# Patient Record
Sex: MALE | Race: WHITE
[De-identification: names, ages, dates, MRNs, and addresses within clinical notes are randomized per-mention and may not be internally consistent; named-entity substitution may affect disease eponyms.]

---

## 2017-03-31 ENCOUNTER — HOSPITAL ENCOUNTER (EMERGENCY)
Dept: HOSPITAL 25 - ED | Age: 57
Discharge: HOME | End: 2017-03-31
Payer: COMMERCIAL

## 2017-03-31 ENCOUNTER — HOSPITAL ENCOUNTER (EMERGENCY)
Dept: HOSPITAL 25 - UCEAST | Age: 57
Discharge: LEFT BEFORE DISCHARGE | End: 2017-03-31
Payer: COMMERCIAL

## 2017-03-31 VITALS — DIASTOLIC BLOOD PRESSURE: 93 MMHG | SYSTOLIC BLOOD PRESSURE: 164 MMHG

## 2017-03-31 VITALS — SYSTOLIC BLOOD PRESSURE: 148 MMHG | DIASTOLIC BLOOD PRESSURE: 85 MMHG
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
## 2017-03-31 DIAGNOSIS — S86.912A: Primary | ICD-10-CM

## 2017-03-31 DIAGNOSIS — M79.89: Primary | ICD-10-CM
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
## 2017-03-31 DIAGNOSIS — M79.605: ICD-10-CM

## 2017-03-31 PROCEDURE — 99281 EMR DPT VST MAYX REQ PHY/QHP: CPT

## 2017-03-31 PROCEDURE — 99212 OFFICE O/P EST SF 10 MIN: CPT

## 2017-03-31 PROCEDURE — G0463 HOSPITAL OUTPT CLINIC VISIT: HCPCS

## 2017-03-31 NOTE — RAD
HISTORY: Left lower leg trauma, calf swelling



COMPARISONS: None



VIEWS: 8, Frontal and lateral views of the left foreleg, frontal, lateral, and oblique

views of the left foot, frontal, lateral, and oblique views of the left ankle



FINDINGS:



BONE DENSITY: Normal.

BONES: There is no displaced fracture. Incidentally noted is an exostosis of the distal

fifth metatarsal

JOINTS: There is no arthropathy.    

ALIGNMENT: There is no dislocation. 

SOFT TISSUES: Unremarkable.



OTHER FINDINGS: None.



IMPRESSION: 

NO ACUTE OSSEOUS INJURY TO THE LEFT FORELEG, ANKLE, OR FOOT. IF SYMPTOMS PERSIST,

RECOMMEND REPEAT IMAGING.

## 2017-03-31 NOTE — RAD
HISTORY: Left leg pain



COMPARISONS: None relevant



TECHNIQUE: Multiple transverse and longitudinal ultrasound images were obtained of the

left lower extremity from the level of the common femoral vein inferiorly through to the

infrapopliteal veins  using grayscale, color Doppler, and spectral Doppler imaging with

and without compression and with augmentation. Comparison images were obtained of the

contralateral common femoral vein.



FINDINGS:

VEINS: The venous system of the left lower extremity is compressible throughout its

course, with normal flow on color Doppler imaging and normal response to augmentation on

spectral Doppler imaging.



SOFT TISSUES: There is a small fluid collection within the left calf measuring

approximately 4.6 x 0.7 x 11.2 cm in size.



OTHER FINDINGS: None.



IMPRESSION: 

1.  NO LEFT LOWER EXTREMITY DEEP VEIN THROMBOSIS

2.  SMALL FLUID COLLECTION WITHIN THE LEFT CALF MAY REFLECT SEROMA/EVOLVING HEMATOMA GIVEN

THE HISTORY OF TRAUMA

## 2017-04-01 NOTE — ED
Lower Extremity





- HPI Summary


HPI Summary: 





Patient presents to ED with CC left lower extremity swelling after sustaining 

an injury while skiing.  He states he fell and came out of his boot.  Since then

, he has had ecchymosis around the posterior portion of the left foot, +3 

swelling and painful to walk.  He was ambulatory on arrival and states he was 

able to bike to work this am.  His pain is located in the posterior calf.  He 

denies recent travel, smoking, malignancy or recent bedrest.  He has never had 

anything like this before.  Denies PMHx and denies medications. 





- History of Current Complaint


Chief Complaint: EDExtremityLower


Stated Complaint: LT LEG SWOLLEN


Time Seen by Provider: 03/31/17 22:00


Hx Obtained From: Patient


Mechanism Of Injury: Fall From A Standing Position, Twisted


Onset of Pain: Immediate


Onset/Duration: Hours


Severity Initially: Moderate


Severity Currently: Moderate


Pain Intensity: 0


Pain Scale Used: 0-10 Numeric


Timing: Constant


Location: Is Discrete @ - left posterior calf


Character Of Pain: Aching, Throbbing


Associated Signs And Symptoms: Positive: Swelling, Bruising


Aggravating Factor(s): Standing, Ambulation


Alleviating Factor(s): Rest, Elevation


Able to Bear Weight: Yes





- Risk Factors


Gout Risk Factors: Age Over 40, Male


DVT Risk Factors: Negative


Septic Arthritis Risk Factor: Negative





- Allergies/Home Medications


Allergies/Adverse Reactions: 


 Allergies











Allergy/AdvReac Type Severity Reaction Status Date / Time


 


No Known Allergies Allergy   Verified 03/31/17 21:34














PMH/Surg Hx/FS Hx/Imm Hx


Previously Healthy: Yes


Endocrine/Hematology History: 


   Denies: Hx Diabetes, Hx Thyroid Disease


Cardiovascular History: 


   Denies: Hx Hypertension


Respiratory History: 


   Denies: Hx Asthma, Hx Chronic Obstructive Pulmonary Disease (COPD)


GI History: 


   Denies: Hx Ulcer


Infectious Disease History: No


Infectious Disease History: 


   Denies: Hx Hepatitis, Hx Human Immunodeficiency Virus (HIV), Traveled 

Outside the [place] in Last 30 Days





- Social History


Occupation: Employed Full-time


Lives: With Family


Alcohol Use: None


Hx Substance Use: No


Substance Use Type: Reports: None


Hx Tobacco Use: No


Smoking Status (MU): Never Smoked Tobacco


Do You Chew or Dip Tobacco: No





Review of Systems


Constitutional: Negative


Eyes: Negative


Cardiovascular: Negative


Respiratory: Negative


Genitourinary: Negative


Positive: no symptoms reported, see HPI


Positive: Myalgia


Positive: Bruising


Neurological: Negative


Psychological: Normal


All Other Systems Reviewed And Are Negative: Yes





Physical Exam


Triage Information Reviewed: Yes


Vital Signs On Initial Exam: 


 Initial Vitals











Temp Pulse Resp BP Pulse Ox


 


 97.9 F   60   16   148/85   100 


 


 03/31/17 21:30  03/31/17 21:30  03/31/17 21:30  03/31/17 21:30  03/31/17 21:30











Vital Signs Reviewed: Yes


Appearance: Positive: Well-Appearing, Well-Nourished


Skin: Positive: Warm, Skin Color Reflects Adequate Perfusion


Eyes: Positive: Normal, ROMY


ENT: Positive: Normal ENT inspection


Neck: Positive: Supple, No Lymphadenopathy


Respiratory/Lung Sounds: Positive: Clear to Auscultation, Breath Sounds Present


Cardiovascular: Positive: Normal, RRR


Bowel Sounds: Positive: Present


Musculoskeletal: Positive: Pain @ - posterior calf


Neurological: Positive: Normal, Sensory/Motor Intact, Alert, Oriented to Person 

Place, Time


Psychiatric: Positive: Normal


AVPU Assessment: Alert





Diagnostics





- Vital Signs


 Vital Signs











  Temp Pulse Resp BP Pulse Ox


 


 03/31/17 21:30  97.9 F  60  16  148/85  100














- Laboratory


Lab Statement: Any lab studies that have been ordered have been reviewed, and 

results considered in the medical decision making process.





Lower Extremity Course/Dx





- Course


Course Of Treatment: Patient sent for US.  Xrays at  of lower leg and ankle 

revealed no fracture.  Patient encouraged to elevate and ice and use 

compression hose and ibuprofen for the time until healing.  He agrees and OK 

with discharge and follow up.





- Diagnoses


Differential Diagnosis/HQI/PQRI: Positive: Contusion, DVT, Sprain, Strain


Provider Diagnoses: 


 Muscle strain








Discharge





- Discharge Plan


Condition: Stable


Disposition: HOME


Patient Education Materials:  Leg Edema (ED)


Referrals: 


Afua Balbuena MD [Primary Care Provider] - 


Additional Instructions: 


Ibuprofen 600mg three times daily with meals for pain.  


Follow up with orthopedic physician in 5-7 days if symptoms persist. 


If numbness, tingling, decreased sensation, increased pain, temperature changes 

or pallor noted in toes, come back to ER immediately. 


Protect the area.   For your comfort level, do not bear weight, pull or push 

until you are without pain.


Rest the involved area, but not too long.  You may need to be off your injury 

for some time to allow for healing, however excessive immobilization of joints 

can lead to stiffness and delay healing time. Early mobilization is encouraged 

if it is pain-free.


Ice.  Not directly on the skin. Cover with a towel. Apply ice no more than 30 

minutes at a time 


Compression:  You may use and keep an ace wrap bandage over the injury to 

decrease swelling. Again, this should be limited and be taken off periodically 

to encourage early range of motion and mobilization.


Elevate: Try to elevate the injured area above the heart whenever possible.

## 2017-04-02 NOTE — UC
Clare LEONARDO Michael, scribed for Fifi Francisco MD on 03/31/17 at 1944 .





Lower Extremity/Ankle HPI





- HPI Summary


HPI Summary: 





55 y/o male comes to [facility] after a skiing accident 6 days ago. The pt 

c/o LLE swelling and stiffness. He rates the LLE pain as a 1 out of 10 on a 

pain assessment scale and the stiffness is aggravated with ambulation. The pt 

denies knee pain, CP, palpitations, and SOB.  





- History of Current Complaint


Chief Complaint: UCLowerExtremity


Stated Complaint: LT LEG INJURY


Time Seen by Provider: 03/31/17 19:32


Hx Obtained From: Patient, Medical Records


Onset/Duration: Gradual Onset, Lasting Days, Still Present


Severity Initially: Mild


Severity Currently: Mild


Pain Intensity: 1


Pain Scale Used: 0-10 Numeric


Aggravating Factor(s): Ambulation


Alleviating Factor(s): Nothing


Related History: Other - skiiing





- Allergies/Home Medications


Allergies/Adverse Reactions: 


 Allergies











Allergy/AdvReac Type Severity Reaction Status Date / Time


 


No Known Allergies Allergy   Verified 03/31/17 21:34














PMH/Surg Hx/FS Hx/Imm Hx


Endocrine History Of: 


   Denies: Diabetes, Thyroid Disease


Cardiovascular History Of: 


   Denies: Cardiac Disorders, Hypertension


Respiratory History Of: 


   Denies: COPD, Asthma


GI/ History Of: 


   Denies: Ulcer





- Surgical History


Surgical History: None





- Family History


Known Family History: 


   Negative: Blood Disorder





- Social History


Occupation: Employed Full-time


Lives: With Family


Alcohol Use: None


Substance Use Type: None


Smoking Status (MU): Never Smoked Tobacco





Review of Systems


Respiratory: Negative - SOB


Cardiovascular: Negative - CP, palpitaions


Musculoskeletal: Other: - LLE swelling and stiffness


All Other Systems Reviewed And Are Negative: Yes





Physical Exam


Triage Information Reviewed: Yes


Appearance: Well-Nourished


Vital Signs: 


 Initial Vital Signs











Temp  98.8 F   03/31/17 18:47


 


Pulse  65   03/31/17 18:47


 


Resp  16   03/31/17 18:47


 


BP  164/93   03/31/17 18:47


 


Pulse Ox  97   03/31/17 18:47











Vital Signs Reviewed: Yes


Eye Exam: Normal


ENT Exam: Normal


Neck exam: Normal - no adenopathy


Respiratory Exam: Normal


Respiratory: Positive: Chest non-tender, Lungs clear, Normal breath sounds, No 

respiratory distress, Other: - no dyspnea, no tachypnea. nml respiratory rate


Cardiovascular: Positive: RRR, No Murmur, Pulses Normal, Brisk Capillary Refill


Abdominal Exam: Normal


Abdomen Description: Positive: Nontender, No Organomegaly, Soft


Bowel Sounds: Positive: Present


Musculoskeletal: Positive: Other: - lower distal tibia area tenderness. 

bruising of the left heel.


Neurological Exam: Normal - nonfocal. grossly intact.


Psychological Exam: Normal - conserving easily


Psychological: Positive: Age Appropriate Behavior


Skin Exam: Normal - no rashes visible





Diagnostics





- Radiology


  ** Ankle XR


Xray Interpretation: No Acute Changes - NO ACUTE OSSEOUS INJURY TO THE LEFT 

FORELEG, ANKLE, OR FOOT. IF SYMPTOMS PERSIST, RECOMMEND REPEAT IMAGING.


Radiology Interpretation Completed By: Radiologist





  ** Foot XR


Xray Interpretation: No Acute Changes - NO ACUTE OSSEOUS INJURY TO THE LEFT 

FORELEG, ANKLE, OR FOOT. IF SYMPTOMS PERSIST, RECOMMEND REPEAT IMAGING.


Radiology Interpretation Completed By: Radiologist





  ** Leg XR


Xray Interpretation: No Acute Changes - NO ACUTE OSSEOUS INJURY TO THE LEFT 

FORELEG, ANKLE, OR FOOT. IF SYMPTOMS PERSIST, RECOMMEND REPEAT IMAGING.


Radiology Interpretation Completed By: Radiologist





Lower Extremity Course/Dx





- Course


Course Of Treatment: Patient offered and encouraged EMS. Patient declines. 

[place] [facility] [place]  Bret Cotton.





- Differential Dx/Diagnosis


Provider Diagnoses: Left lower extremity swelling and pain





Discharge





- Discharge Plan


Condition: Stable


Disposition: AGAINST MEDICAL ADVICE


Referrals: 


Afua Balbuena MD [Primary Care Provider] - 





The documentation as recorded by the Clare warren Michael accurately 

reflects the service I personally performed and the decisions made by me, Fifi Francisco MD.

## 2019-07-27 ENCOUNTER — HOSPITAL ENCOUNTER (EMERGENCY)
Dept: HOSPITAL 25 - UCEAST | Age: 59
Discharge: HOME | End: 2019-07-27
Payer: COMMERCIAL

## 2019-07-27 VITALS — DIASTOLIC BLOOD PRESSURE: 98 MMHG | SYSTOLIC BLOOD PRESSURE: 154 MMHG

## 2019-07-27 DIAGNOSIS — L25.9: Primary | ICD-10-CM

## 2019-07-27 PROCEDURE — 85060 BLOOD SMEAR INTERPRETATION: CPT

## 2019-07-27 PROCEDURE — 80053 COMPREHEN METABOLIC PANEL: CPT

## 2019-07-27 PROCEDURE — 99211 OFF/OP EST MAY X REQ PHY/QHP: CPT

## 2019-07-27 PROCEDURE — 84443 ASSAY THYROID STIM HORMONE: CPT

## 2019-07-27 PROCEDURE — 85025 COMPLETE CBC W/AUTO DIFF WBC: CPT

## 2019-07-27 PROCEDURE — 82607 VITAMIN B-12: CPT

## 2019-07-27 PROCEDURE — 82728 ASSAY OF FERRITIN: CPT

## 2019-07-27 PROCEDURE — 36415 COLL VENOUS BLD VENIPUNCTURE: CPT

## 2019-07-27 PROCEDURE — G0463 HOSPITAL OUTPT CLINIC VISIT: HCPCS

## 2019-07-27 PROCEDURE — 83735 ASSAY OF MAGNESIUM: CPT

## 2019-07-27 NOTE — UC
Skin Complaint HPI





- HPI Summary


HPI Summary: 





59 year old male with no pmH presents with burning, itchy sensation, worse at 

night . ~ 8 days.  started in groin, spread to chest, upper arms, b/l thighs.  

no rashes seen.  Patient lives in tent under lean-to outdoors with a bird 

living in lean-to next to tent.   recent travel to [facility] with exposure 

to sand.   STayed at 5 star hotel, did to laundry there.    





NO new food, no eating mushrooms, no ETOH/ drug use.  





Denies fever, chills, nipples pain, rashes, joint ahches, headaches, muscle 

pains, involvement of hair.    Otherwise feeling well. 


 





- History of Current Complaint


Chief Complaint: UCSkin


Time Seen by Provider: 07/27/19 13:12


Stated Complaint: RASH


Hx Obtained From: Patient


Onset/Duration: Sudden Onset, Lasting Days, Still Present


Timing: Constant


Current Severity: None


Pain Intensity: 0


Pain Scale Used: 0-10 Numeric


Location: Diffuse


Character: Exposure to Heat Intermittent


Aggravating Factor(s): Nothing


Alleviating Factor(s): Nothing


Associated Signs & Symptoms: Negative: Numbness, Weakness, Pallor, Difficulty 

Breathing, Fever, Chills, Chest Pain, Throat Tightening, Rash, Abdominal Pain, 

Lightheadedness, Syncope, Bruising, Tenderness, Red Streaks, Joint Swelling





- Allergy/Home Medications


Allergies/Adverse Reactions: 


 Allergies











Allergy/AdvReac Type Severity Reaction Status Date / Time


 


oxacillin Allergy  Hives Verified 07/27/19 13:13














PMH/Surg Hx/FS Hx/Imm Hx


Previously Healthy: Yes





- Surgical History


Surgical History: Yes


Surgery Procedure, Year, and Place: Left knee.  TONSILS





- Family History


Known Family History: Positive: None, Non-Contributory





- Social History


Alcohol Use: None


Substance Use Type: None


Smoking Status (MU): Never Smoked Tobacco





Review of Systems


All Other Systems Reviewed And Are Negative: Yes


Constitutional: Positive: Negative


Skin: Positive: Other - itching, burning sensation on skin


Motor: Negative: Decreased ROM, Weakness


Musculoskeletal: Negative: Arthralgia, Calf Tenderness, Decreased ROM, Edema, 

Myalgia


Neurological: Negative: Weakness, Paresthesia, Numbness


Is Patient Immunocompromised?: No





Physical Exam


Triage Information Reviewed: Yes


Appearance: Well-Appearing, No Pain Distress, Well-Nourished


Vital Signs: 


 Initial Vital Signs











Temp  99.7 F   07/27/19 13:10


 


Pulse  69   07/27/19 13:10


 


Resp  17   07/27/19 13:10


 


BP  154/98   07/27/19 13:10


 


Pulse Ox  98   07/27/19 13:10











Vital Signs Reviewed: Yes


Eyes: Positive: Conjunctiva Clear


Neck: Positive: Supple, Nontender, No Lymphadenopathy.  Negative: Nuchal 

Rigidity


Respiratory: Positive: No respiratory distress, No accessory muscle use


Abdomen Description: Positive: Soft.  Negative: Distended, Guarding


Male Genital Exam: Positive: Normal Genitalia.  Negative: Epididymal Tenderness

, Erythema, Hernia Mass, Inguinal Tenderness, Lesions


Musculoskeletal: Positive: Strength Intact, ROM Intact.  Negative: No Edema


Neurological: Positive: Alert, Muscle Tone Normal


Psychological Exam: Normal


Skin: Positive: Other - no rashes, lesions, excoriations, vesicles, erytthema 

seen throughout body, including skin folds, between finger/ toes, nippes, chest

, groin b/l, UE, LEs  no TTP over joints, no effusions noted..  Negative: Rashes

, Breakdown





Course/Dx





- Course


Course Of Treatment: 





Discussed case with Dr. Doan and Dr. Ulloa, patients PCP.  lab work drawn 

to eval for interal cause such as kidney, liver damage.   Treatment for mites 

including washing all bedding/ home and showering.    ANtihistamines to help 

with itch at night. 


 





- Differential Diagnoses - Skin Complaint


Differential Diagnoses: Angioedema, Cellulitis, Contact Dermatitis, Scabies, 

Tick Born Illness, Viral Exanthem





- Diagnoses


Provider Diagnosis: 


 Contact dermatitis








Discharge





- Sign-Out/Discharge


Documenting (check all that apply): Patient Departure


All imaging exams completed and their final reports reviewed: No Studies





- Discharge Plan


Condition: Good


Disposition: HOME


Patient Education Materials:  Contact Dermatitis (ED)


Referrals: 


Afua Ulloa MD [Primary Care Provider] -  (2-5 days )


Additional Instructions: 


- Blood work drawn to evaluate for systemic cause of itching, follow up with 

Dr. Ulloa.    She was notified of your problem.  Follow up with her this week 








- ANtihistamine, such as benadryl, to help with sleeping at night 


- Wash bedding, living area in hot water 


- Daily showering 


- Continue to monitor area for rash 


- Increase fluid intake 


 








- Billing Disposition and Condition


Condition: GOOD


Disposition: Home





- Attestation Statements


Provider Attestation: 





I was available for consult. This patient was seen by the KELVIN. The patient was 

not presented to, seen by, or examined by me. -Bam

## 2019-07-28 LAB
ALBUMIN SERPL BCG-MCNC: 4.2 G/DL (ref 3.2–5.2)
ALBUMIN/GLOB SERPL: 2.1 {RATIO} (ref 1–3)
ALP SERPL-CCNC: 63 U/L (ref 34–104)
ALT SERPL W P-5'-P-CCNC: 13 U/L (ref 7–52)
ANION GAP SERPL CALC-SCNC: 9 MMOL/L (ref 2–11)
AST SERPL-CCNC: 16 U/L (ref 13–39)
BASOPHILS # BLD AUTO: 0 10^3/UL (ref 0–0.2)
BUN SERPL-MCNC: 18 MG/DL (ref 6–24)
BUN/CREAT SERPL: 18.9 (ref 8–20)
CALCIUM SERPL-MCNC: 9.1 MG/DL (ref 8.6–10.3)
CHLORIDE SERPL-SCNC: 105 MMOL/L (ref 101–111)
EOSINOPHIL # BLD AUTO: 0.1 10^3/UL (ref 0–0.6)
GLOBULIN SER CALC-MCNC: 2 G/DL (ref 2–4)
GLUCOSE SERPL-MCNC: 106 MG/DL (ref 70–100)
HCO3 SERPL-SCNC: 24 MMOL/L (ref 22–32)
HCT VFR BLD AUTO: 38 % (ref 42–52)
HGB BLD-MCNC: 13.2 G/DL (ref 14–18)
LYMPHOCYTES # BLD AUTO: 1.2 10^3/UL (ref 1–4.8)
MAGNESIUM SERPL-MCNC: 2 MG/DL (ref 1.9–2.7)
MCH RBC QN AUTO: 30 PG (ref 27–31)
MCHC RBC AUTO-ENTMCNC: 35 G/DL (ref 31–36)
MCV RBC AUTO: 86 FL (ref 80–94)
MONOCYTES # BLD AUTO: 0.6 10^3/UL (ref 0–0.8)
NEUTROPHILS # BLD AUTO: 4.5 10^3/UL (ref 1.5–7.7)
NRBC # BLD AUTO: 0 10^3/UL
NRBC BLD QL AUTO: 0.1
PLATELET # BLD AUTO: 298 10^3/UL (ref 150–450)
POTASSIUM SERPL-SCNC: 4.2 MMOL/L (ref 3.5–5)
PROT SERPL-MCNC: 6.2 G/DL (ref 6.4–8.9)
RBC # BLD AUTO: 4.44 10^6 /UL (ref 4.18–5.48)
SODIUM SERPL-SCNC: 138 MMOL/L (ref 135–145)
TSH SERPL-ACNC: 2.29 MCIU/ML (ref 0.34–5.6)
WBC # BLD AUTO: 6.4 10^3/UL (ref 3.5–10.8)

## 2019-07-28 NOTE — UC
- Progress Note


Progress Note: 





CBC with diff non concerning - path pending


CMP wnl


tsh wnl 


No change


americoj 7/28/19





Course/Dx





- Diagnoses


Provider Diagnoses: 


 Contact dermatitis








Discharge





- Sign-Out/Discharge


Documenting (check all that apply): Post-Discharge Follow Up


All imaging exams completed and their final reports reviewed: No Studies





- Discharge Plan


Condition: Good


Disposition: HOME


Patient Education Materials:  Contact Dermatitis (ED)


Referrals: 


Afua Balbuena MD [Primary Care Provider] -  (2-5 days )


Additional Instructions: 


- Blood work drawn to evaluate for systemic cause of itching, follow up with 

Dr. Balbuena.    She was notified of your problem.  Follow up with her this week 








- ANtihistamine, such as benadryl, to help with sleeping at night 


- Wash bedding, living area in hot water 


- Daily showering 


- Continue to monitor area for rash 


- Increase fluid intake 


 








- Billing Disposition and Condition


Condition: GOOD


Disposition: Home

## 2019-07-30 LAB — FERRITIN SERPL IA-MCNC: 50.4 NG/ML (ref 24–336)

## 2019-11-09 ENCOUNTER — HOSPITAL ENCOUNTER (EMERGENCY)
Dept: HOSPITAL 25 - UCEAST | Age: 59
Discharge: HOME | End: 2019-11-09
Payer: COMMERCIAL

## 2019-11-09 VITALS — SYSTOLIC BLOOD PRESSURE: 152 MMHG | DIASTOLIC BLOOD PRESSURE: 91 MMHG

## 2019-11-09 DIAGNOSIS — Z88.1: ICD-10-CM

## 2019-11-09 DIAGNOSIS — S80.812A: Primary | ICD-10-CM
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
## 2019-11-09 PROCEDURE — G0463 HOSPITAL OUTPT CLINIC VISIT: HCPCS

## 2019-11-09 PROCEDURE — 99212 OFFICE O/P EST SF 10 MIN: CPT

## 2019-11-09 NOTE — XMS REPORT
Continuity of Care Document (CCD)

 Created on:2019



Patient:Tate Bello

Sex:Male

:1960

External Reference #:[id number]





Demographics







 Address  [address] [place] [address]

 

 Mobile Phone  [phone]

 

 Email Address  [email or web address]

 

 Preferred Language  en

 

 Marital Status  Not  or 

 

 [employer] Affiliation  Unknown

 

 Race  White

 

 Ethnic Group  Not  or 









Author







 Name  Chriss Sheikh MD (transmitted by agent of provider Sue Hernandez
)

 

 Address  [address] [place] [address]









Care Team Providers







 Name  Role  Phone

 

 Afua Balbuena MD - Family  Care Team Information   [phone]
[phone]



 Medicine    









Problems







 Description

 

 No Information Available







Social History







 Type  Date  Description  Comments

 

 Birth Sex    Unknown  

 

 ETOH Use    Denies alcohol use  

 

 Tobacco Use  Start: Unknown  Patient has never smoked  

 

 Smoking Status  Reviewed: 19  Patient has never smoked  

 

 Exercise Type/Frequency    Exercises regularly  







Allergies, Adverse Reactions, Alerts







 Active Allergies  Reaction  Severity  Comments  Date

 

 Oxacillin      hives  2019







Medications







 Active Medications  SIG  Qnty  Indications  Ordering Provider  Date

 

 Ibuprofen  2 tabs by mouth      Unknown  



       200mg Tablets  every 8 hours        



   take with food        

 

 Vitamin E Shots        Unknown  







Immunizations







 Description

 

 No Information Available







Vital Signs







 Date  Vital  Result  Comment

 

 2019  3:35pm  Height  72 inches  6'0"









 Heart Rate  82 /min  

 

 BP Systolic  142 mmHg  

 

 BP Diastolic  88 mmHg  

 

 Respiratory Rate  18 /min  

 

 Body Temperature  98.0 F  

 

 Pain Level  0  









 2019  1:07pm  Height  72 inches  6'0"









 Weight  190.00 lb  

 

 Heart Rate  78 /min  

 

 BP Systolic  128 mmHg  

 

 BP Diastolic  70 mmHg  

 

 Respiratory Rate  12 /min  

 

 Pain Level  0  

 

 BMI (Body Mass Index)  25.8 kg/m2  







Results







 Test  Date  Facility  Test  Result  H/L  Range  Note

 

 Comp Metabolic  2019  [facility]  Sodium  138 mmol/L  Normal  
135-145  1



 Panel    [address]          



     [place] [address] [phone]          









 Potassium  4.2 mmol/L  Normal  3.5-5.0  

 

 Chloride  105 mmol/L  Normal  101-111  

 

 Co2 Carbon Dioxide  24 mmol/L  Normal  22-32  

 

 Anion Gap  9 mmol/L  Normal  2-11  

 

 Glucose  106 mg/dL  High    

 

 Blood Urea Nitrogen  18 mg/dL  Normal  6-24  

 

 Creatinine  0.95 mg/dL  Normal  0.67-1.17  

 

 BUN/Creatinine Ratio  18.9  Normal  8-20  

 

 Calcium  9.1 mg/dL  Normal  8.6-10.3  

 

 Total Protein  6.2 g/dL  Low  6.4-8.9  

 

 Albumin  4.2 g/dL  Normal  3.2-5.2  

 

 Globulin  2.0 g/dL  Normal  2-4  

 

 Albumin/Globulin Ratio  2.1  Normal  1-3  

 

 Total Bilirubin  1.10 mg/dL  High  0.2-1.0  

 

 Alkaline Phosphatase  63 U/L  Normal    

 

 Alt  13 U/L  Normal  7-52  

 

 Ast  16 U/L  Normal  13-39  

 

 Egfr Non-  80.6    >60  

 

 Egfr   97.5    >60  2









 Laboratory test  2019  [facility]  Magnesium  2.0 mg/dL  
Normal  1.9-2.7  3



 finding    [address]          



     [place] [address] [phone]          









 TSH (Thyroid Stim Horm)  2.29 mcIU/mL  Normal  0.34-5.60  4









 CBC Auto  2019  [facility]  White Blood  6.4 10^3/uL  Normal  
3.5-10.8  



 Diff    [address]  Count        



     [place] [address] [phone]          









 Red Blood Count  4.44 10^6/uL  Normal  4.18-5.48  

 

 Hemoglobin  13.2 g/dL  Low  14.0-18.0  

 

 Hematocrit  38 %  Low  42-52  

 

 Mean Corpuscular Volume  86 fL  Normal  80-94  

 

 Mean Corpuscular Hemoglobin  30 pg  Normal  27-31  

 

 Mean Corpuscular HGB Conc  35 g/dL  Normal  31-36  

 

 Red Cell Distribution Width  13 %  Normal  10-15  

 

 Platelet Count  298 10^3/uL  Normal  150-450  

 

 Mean Platelet Volume  7.8 fL  Normal  7.4-10.4  

 

 Abs Neutrophils  4.5 10^3/uL  Normal  1.5-7.7  

 

 Abs Lymphocytes  1.2 10^3/uL  Normal  1.0-4.8  

 

 Abs Monocytes  0.6 10^3/uL  Normal  0-0.8  

 

 Abs Eosinophils  0.1 10^3/uL  Normal  0-0.6  

 

 Abs Basophils  0.0 10^3/uL  Normal  0-0.2  

 

 Abs Nucleated RBC  0.0 10^3/uL      

 

 Granulocyte %  69.9 %      

 

 Lymphocyte %  19.0 %      

 

 Monocyte %  9.3 %      

 

 Eosinophil %  1.4 %      

 

 Basophil %  0.4 %      

 

 Nucleated Red Blood Cells %  0.1      









 Manual  2019  [facility]  Immature  3.0 %  Normal  0-9  



 Differential    [address]  Granulocytes        



     [place] [address] [phone]          









 Neutrophil %  68.0 %      

 

 Band %  3.0 %  Normal  0-8  

 

 Lymphocytes %  22.0 %      

 

 Monocytes %  5.0 %      

 

 Eosinophils %  1.0 %      

 

 Basophil %  1.0 %      

 

 RBC Morphology  Normal    Normal  









 Laboratory test  2019  [facility]  Pathologist Review  (SEE 
NOTE)      5



 finding    [address]          



     [place] [address] [phone]          









 Ferritin  50.4 ng/mL  Normal    6

 

 Vitamin B12  167 pg/mL  Low  180-914  7









 1  [id number]

 

 2  *******Because ethnic data is not always readily available,



   this report includes an eGFR for both -Americans and



   non- Americans.****



   The National Kidney Disease Education Program (NKDEP) does



   not endorse the use of the MDRD equation for patients that



   are not between the ages of 18 and 70, are pregnant, have



   extremes of body size, muscle mass, or nutritional status,



   or are non- or non-.



   According to the National Kidney Foundation, irrespective of



   diagnosis, the stage of the disease is based on the level of



   kidney function:



   Stage Description                      GFR(mL/min/1.73 m(2))



   1     Kidney damage with normal or decreased GFR       90



   2     Kidney damage with mild decrease in GFR          60-89



   3     Moderate decrease in GFR                         30-59



   4     Severe decrease in GFR                           15-29



   5     Kidney failure                       <15 (or dialysis)

 

 3  [id number]

 

 4  [id number]

 

 5  Mild normocytic anemia.  Additional studies as clinically



   warranted.



   



   Reviewed by Dr. Simons

 

 6  [id number]

 

 7  Normal Range 180 to 914



   Indeterminate Range 145 to 180



   Deficient Range  <145







Procedures







 Date  Code  Description  Status

 

 2019  43043  Inject/Drain Joint/Bursa Major W/O US  Completed







Medical Devices







 Description

 

 No Information Available







Encounters







 Type  Date  Location  Provider  Dx  Diagnosis

 

 Office Visit  2019  [facility]  S83.511D  Sprain of



   1:00p  [facility] Sheikh MD    anterior cruciate



           ligament of right



           knee, subs









 S83.241D  Oth tear of medial meniscus, current injury, r knee, subs







Assessments







 Date  Code  Description  Provider

 

 2019  S83.511D  Sprain of anterior cruciate ligament of  Chriss Sheikh MD



     right knee, subseque  

 

 2019  S83.241D  Other tear of medial meniscus, current  Chriss Sheikh MD



     injury, right knee, s  

 

 2019  S83.271D  Complex tear of lateral meniscus, current  Chriss Sheikh MD



     injury, right knee  

 

 2019  S83.511D  Sprain of anterior cruciate ligament of  Chriss Sheikh MD



     right knee, subseque  

 

 2019  S83.241D  Other tear of medial meniscus, current  Chriss Sheikh MD



     injury, right knee, s  







Plan of Treatment

2019 - Chriss Sheikh, MDS83.511D Sprain of anterior cruciate 
ligament of right knee, subsequeNew Therapy:Physical TherapyFollow up:Follow up
:   end of ski season or sooner as hyrzvxP98.241D Other tear of medial meniscus
, current injury, right knee, sS83.271D Complex tear of lateral meniscus, 
current injury, right knee



Functional Status







 Description

 

 No Information Available







Mental Status







 Description

 

 No Information Available







Referrals







 Description

 

 No Information Available

## 2019-11-09 NOTE — XMS REPORT
Continuity of Care Document (CCD)

 Created on:2019



Patient:Tate Bello

Sex:Male

:1960

External Reference #:[id number]





Demographics







 Address  [address] [place] [address]

 

 Mobile Phone  [phone]

 

 Email Address  [email or web address]

 

 Preferred Language  en

 

 Marital Status  Not  or 

 

 Buddhist Affiliation  Unknown

 

 Race  White

 

 Ethnic Group  Not  or 









Author







 Name  Chriss Sheikh MD (transmitted by agent of provider Sue Hernandez
)

 

 Address  [address] [place] [address]









Care Team Providers







 Name  Role  Phone

 

 Afua Balbuena MD - Family  Care Team Information   [phone]
[phone]



 Medicine    









Problems







 Description

 

 No Information Available







Social History







 Type  Date  Description  Comments

 

 Birth Sex    Unknown  

 

 ETOH Use    Denies alcohol use  

 

 Tobacco Use  Start: Unknown  Patient has never smoked  

 

 Smoking Status  Reviewed: 19  Patient has never smoked  

 

 Exercise Type/Frequency    Exercises regularly  







Allergies, Adverse Reactions, Alerts







 Active Allergies  Reaction  Severity  Comments  Date

 

 Oxacillin      hives  2019







Medications







 Active Medications  SIG  Qnty  Indications  Ordering Provider  Date

 

 Ibuprofen  2 tabs by mouth      Unknown  



       200mg Tablets  every 8 hours        



   take with food        

 

 Vitamin E Shots        Unknown  







Immunizations







 Description

 

 No Information Available







Vital Signs







 Date  Vital  Result  Comment

 

 2019  3:35pm  Height  72 inches  6'0"









 Heart Rate  82 /min  

 

 BP Systolic  142 mmHg  

 

 BP Diastolic  88 mmHg  

 

 Respiratory Rate  18 /min  

 

 Body Temperature  98.0 F  

 

 Pain Level  0  









 2019  1:07pm  Height  72 inches  6'0"









 Weight  190.00 lb  

 

 Heart Rate  78 /min  

 

 BP Systolic  128 mmHg  

 

 BP Diastolic  70 mmHg  

 

 Respiratory Rate  12 /min  

 

 Pain Level  0  

 

 BMI (Body Mass Index)  25.8 kg/m2  







Results







 Test  Date  Facility  Test  Result  H/L  Range  Note

 

 Comp Metabolic  2019  [facility]  Sodium  138 mmol/L  Normal  
135-145  1



 Panel    [address]          



     [place] [address] [phone]          









 Potassium  4.2 mmol/L  Normal  3.5-5.0  

 

 Chloride  105 mmol/L  Normal  101-111  

 

 Co2 Carbon Dioxide  24 mmol/L  Normal  22-32  

 

 Anion Gap  9 mmol/L  Normal  2-11  

 

 Glucose  106 mg/dL  High    

 

 Blood Urea Nitrogen  18 mg/dL  Normal  6-24  

 

 Creatinine  0.95 mg/dL  Normal  0.67-1.17  

 

 BUN/Creatinine Ratio  18.9  Normal  8-20  

 

 Calcium  9.1 mg/dL  Normal  8.6-10.3  

 

 Total Protein  6.2 g/dL  Low  6.4-8.9  

 

 Albumin  4.2 g/dL  Normal  3.2-5.2  

 

 Globulin  2.0 g/dL  Normal  2-4  

 

 Albumin/Globulin Ratio  2.1  Normal  1-3  

 

 Total Bilirubin  1.10 mg/dL  High  0.2-1.0  

 

 Alkaline Phosphatase  63 U/L  Normal    

 

 Alt  13 U/L  Normal  7-52  

 

 Ast  16 U/L  Normal  13-39  

 

 Egfr Non-  80.6    >60  

 

 Egfr   97.5    >60  2









 Laboratory test  2019  [facility]  Magnesium  2.0 mg/dL  
Normal  1.9-2.7  3



 finding    [address]          



     [place] [address] [phone]          









 TSH (Thyroid Stim Horm)  2.29 mcIU/mL  Normal  0.34-5.60  4









 CBC Auto  2019  [facility] system  White Blood  6.4 10^3/uL  Normal  
3.5-10.8  



 Diff    [address]  Count        



     [place] [address] [phone]          









 Red Blood Count  4.44 10^6/uL  Normal  4.18-5.48  

 

 Hemoglobin  13.2 g/dL  Low  14.0-18.0  

 

 Hematocrit  38 %  Low  42-52  

 

 Mean Corpuscular Volume  86 fL  Normal  80-94  

 

 Mean Corpuscular Hemoglobin  30 pg  Normal  27-31  

 

 Mean Corpuscular HGB Conc  35 g/dL  Normal  31-36  

 

 Red Cell Distribution Width  13 %  Normal  10-15  

 

 Platelet Count  298 10^3/uL  Normal  150-450  

 

 Mean Platelet Volume  7.8 fL  Normal  7.4-10.4  

 

 Abs Neutrophils  4.5 10^3/uL  Normal  1.5-7.7  

 

 Abs Lymphocytes  1.2 10^3/uL  Normal  1.0-4.8  

 

 Abs Monocytes  0.6 10^3/uL  Normal  0-0.8  

 

 Abs Eosinophils  0.1 10^3/uL  Normal  0-0.6  

 

 Abs Basophils  0.0 10^3/uL  Normal  0-0.2  

 

 Abs Nucleated RBC  0.0 10^3/uL      

 

 Granulocyte %  69.9 %      

 

 Lymphocyte %  19.0 %      

 

 Monocyte %  9.3 %      

 

 Eosinophil %  1.4 %      

 

 Basophil %  0.4 %      

 

 Nucleated Red Blood Cells %  0.1      









 Manual  2019  Health system  Immature  3.0 %  Normal  0-9  



 Differential    [address]  Granulocytes        



     [place] [address] [phone]          









 Neutrophil %  68.0 %      

 

 Band %  3.0 %  Normal  0-8  

 

 Lymphocytes %  22.0 %      

 

 Monocytes %  5.0 %      

 

 Eosinophils %  1.0 %      

 

 Basophil %  1.0 %      

 

 RBC Morphology  Normal    Normal  









 Laboratory test  2019  [facility] system  Pathologist Review  (SEE 
NOTE)      5



 finding    [address]          



     [place] [address] [phone]          









 Ferritin  50.4 ng/mL  Normal    6

 

 Vitamin B12  167 pg/mL  Low  180-914  7









 1  [id number]

 

 2  *******Because ethnic data is not always readily available,



   this report includes an eGFR for both -Americans and



   non- Americans.****



   The National Kidney Disease Education Program (NKDEP) does



   not endorse the use of the MDRD equation for patients that



   are not between the ages of 18 and 70, are pregnant, have



   extremes of body size, muscle mass, or nutritional status,



   or are non- or non-.



   According to the National Kidney Foundation, irrespective of



   diagnosis, the stage of the disease is based on the level of



   kidney function:



   Stage Description                      GFR(mL/min/1.73 m(2))



   1     Kidney damage with normal or decreased GFR       90



   2     Kidney damage with mild decrease in GFR          60-89



   3     Moderate decrease in GFR                         30-59



   4     Severe decrease in GFR                           15-29



   5     Kidney failure                       <15 (or dialysis)

 

 3  [id number]

 

 4  [id number]

 

 5  Mild normocytic anemia.  Additional studies as clinically



   warranted.



   



   Reviewed by Dr. Simons

 

 6  [id number]

 

 7  Normal Range 180 to 914



   Indeterminate Range 145 to 180



   Deficient Range  <145







Procedures







 Date  Code  Description  Status

 

 2019  83079  Inject/Drain Joint/Bursa Major W/O US  Completed







Medical Devices







 Description

 

 No Information Available







Encounters







 Type  Date  Location  Provider  Dx  Diagnosis

 

 Office Visit  2019  [facility]  S83.511D  Sprain of



   1:00p  [facility]    anterior cruciate



           ligament of right



           knee, subs









 S83.241D  Oth tear of medial meniscus, current injury, r knee, subs







Assessments







 Date  Code  Description  Provider

 

 2019  S83.511D  Sprain of anterior cruciate ligament of  Chriss Sheikh MD



     right knee, subseque  

 

 2019  S83.241D  Other tear of medial meniscus, current  Chirss Sheikh MD



     injury, right knee, s  

 

 2019  S83.271D  Complex tear of lateral meniscus, current  Chriss Sheikh MD



     injury, right knee  

 

 2019  S83.511D  Sprain of anterior cruciate ligament of  Chriss Sheikh MD



     right knee, subseque  

 

 2019  S83.241D  Other tear of medial meniscus, current  Chriss Sheikh MD



     injury, right knee, s  







Plan of Treatment

2019 - Chriss Sheikh, MDS83.511D Sprain of anterior cruciate 
ligament of right knee, subsequeNew Therapy:Physical TherapyFollow up:Follow up
:   end of ski season or sooner as ikbbueB89.241D Other tear of medial meniscus
, current injury, right knee, sS83.271D Complex tear of lateral meniscus, 
current injury, right knee



Functional Status







 Description

 

 No Information Available







Mental Status







 Description

 

 No Information Available







Referrals







 Description

 

 No Information Available